# Patient Record
(demographics unavailable — no encounter records)

---

## 2024-10-29 NOTE — ASSESSMENT
[FreeTextEntry1] :  CAROLYN is a 10-year-old boy now s/p three port laparoscopic appendectomy for acute appendicitis with Dr. Rosenberg on 10/9 who presents today for a routine post op check. He has recovered well from surgery.  He is tolerating a regular diet, having regular bowel movements and is back to his usual activities. On exam the incision is well healed. We reviewed the pathology which confirms the presence of appendicitis.  A copy of the report was provided to the family.  The patient was reminded to let all HCP know that they had surgery and no longer has an appendix. He is cleared to resume full physical activities and submerge in water.    Follow up with the pediatrician as usual and with pediatric surgery should any new or concerning symptoms arise. All questions answered.

## 2024-10-29 NOTE — REASON FOR VISIT
[Patient] : patient [Mother] : mother [Normal bowel movements] : ~He/She~ has normal bowel movements [Tolerating Diet] : ~He/She~ is tolerating diet [Pain] : ~He/She~ does not have pain [Fever] : ~He/She~ does not have fever [Vomiting] : ~He/She~ does not have vomiting [Redness at incision] : ~He/She~ does not have redness at incision [Drainage at incision] : ~He/She~ does not have drainage at incision [Swelling at surgical site] : ~He/She~ does not have swelling at surgical site [de-identified] : 10-9-24 [de-identified] : Dr Rosenberg [de-identified] : CAROLYN is 3  weeks post op from his  appendectomy. His  pathology is consistent with acute appendicitis.  He was discharged home on the same day as surgery.  He presents for a post op visit.